# Patient Record
Sex: FEMALE | Race: WHITE | NOT HISPANIC OR LATINO | Employment: FULL TIME | URBAN - METROPOLITAN AREA
[De-identification: names, ages, dates, MRNs, and addresses within clinical notes are randomized per-mention and may not be internally consistent; named-entity substitution may affect disease eponyms.]

---

## 2022-04-29 ENCOUNTER — HOSPITAL ENCOUNTER (EMERGENCY)
Facility: HOSPITAL | Age: 27
Discharge: HOME | End: 2022-04-30
Attending: EMERGENCY MEDICINE | Admitting: EMERGENCY MEDICINE

## 2022-04-29 DIAGNOSIS — L50.9 URTICARIA: Primary | ICD-10-CM

## 2022-04-29 PROCEDURE — 99282 EMERGENCY DEPT VISIT SF MDM: CPT

## 2022-04-30 VITALS
TEMPERATURE: 98.6 F | WEIGHT: 182.6 LBS | RESPIRATION RATE: 18 BRPM | SYSTOLIC BLOOD PRESSURE: 118 MMHG | OXYGEN SATURATION: 98 % | HEIGHT: 72 IN | DIASTOLIC BLOOD PRESSURE: 70 MMHG | BODY MASS INDEX: 24.73 KG/M2 | HEART RATE: 77 BPM

## 2022-04-30 PROCEDURE — 99282 EMERGENCY DEPT VISIT SF MDM: CPT

## 2022-04-30 PROCEDURE — 25010000002 DEXAMETHASONE PER 1 MG: Performed by: EMERGENCY MEDICINE

## 2022-04-30 PROCEDURE — 96372 THER/PROPH/DIAG INJ SC/IM: CPT

## 2022-04-30 RX ORDER — DEXAMETHASONE SODIUM PHOSPHATE 4 MG/ML
10 INJECTION, SOLUTION INTRA-ARTICULAR; INTRALESIONAL; INTRAMUSCULAR; INTRAVENOUS; SOFT TISSUE ONCE
Status: COMPLETED | OUTPATIENT
Start: 2022-04-30 | End: 2022-04-30

## 2022-04-30 RX ADMIN — DEXAMETHASONE SODIUM PHOSPHATE 10 MG: 4 INJECTION, SOLUTION INTRAMUSCULAR; INTRAVENOUS at 00:38

## 2022-06-16 ENCOUNTER — HOSPITAL ENCOUNTER (EMERGENCY)
Facility: HOSPITAL | Age: 27
Discharge: LEFT BEFORE DISCHARGE | End: 2022-06-16

## 2022-06-16 PROCEDURE — 99211 OFF/OP EST MAY X REQ PHY/QHP: CPT

## 2022-06-17 ENCOUNTER — HOSPITAL ENCOUNTER (EMERGENCY)
Facility: HOSPITAL | Age: 27
Discharge: HOME | End: 2022-06-17
Attending: EMERGENCY MEDICINE | Admitting: EMERGENCY MEDICINE

## 2022-06-17 VITALS
HEIGHT: 72 IN | SYSTOLIC BLOOD PRESSURE: 132 MMHG | RESPIRATION RATE: 19 BRPM | DIASTOLIC BLOOD PRESSURE: 73 MMHG | HEART RATE: 74 BPM | WEIGHT: 181 LBS | TEMPERATURE: 98.1 F | BODY MASS INDEX: 24.52 KG/M2 | OXYGEN SATURATION: 100 %

## 2022-06-17 DIAGNOSIS — Z91.09 ALLERGY TO ENVIRONMENTAL FACTORS: ICD-10-CM

## 2022-06-17 DIAGNOSIS — L50.9 URTICARIA: ICD-10-CM

## 2022-06-17 DIAGNOSIS — L50.9 HIVES: Primary | ICD-10-CM

## 2022-06-17 PROCEDURE — 96372 THER/PROPH/DIAG INJ SC/IM: CPT

## 2022-06-17 PROCEDURE — 25010000002 DEXAMETHASONE SODIUM PHOSPHATE 10 MG/ML SOLUTION

## 2022-06-17 PROCEDURE — 99282 EMERGENCY DEPT VISIT SF MDM: CPT

## 2022-06-17 RX ORDER — DEXAMETHASONE SODIUM PHOSPHATE 10 MG/ML
10 INJECTION, SOLUTION INTRAMUSCULAR; INTRAVENOUS ONCE
Status: COMPLETED | OUTPATIENT
Start: 2022-06-17 | End: 2022-06-17

## 2022-06-17 RX ORDER — PREDNISONE 20 MG/1
20 TABLET ORAL DAILY
Qty: 5 TABLET | Refills: 0 | Status: SHIPPED | OUTPATIENT
Start: 2022-06-17 | End: 2022-06-22

## 2022-06-17 RX ADMIN — DEXAMETHASONE SODIUM PHOSPHATE 10 MG: 10 INJECTION INTRAMUSCULAR; INTRAVENOUS at 10:05

## 2022-06-17 NOTE — DISCHARGE INSTRUCTIONS
As discussed, take steroids over the next 5 days for hives and itching.  You may also try taking antihistamine such as Benadryl to help with the symptoms.  Ice packs or cool water may help with itching.     Follow-up with ophthalmologist for rash surrounding your left eye.  Follow-up with your primary care provider as needed.      Return to the ER for any new or worsening symptoms.

## 2022-06-17 NOTE — ED PROVIDER NOTES
"Subjective   Chief Complaint: Hives    Context: Patient is a 27-year-old  female who presents today with complaints of left orbital swelling, redness, diffuse hives, and itching.  She states her symptoms began Wednesday after working in the garden.  She states that she \"is not new to hives\" and reports high sensitivity to environmental allergens.  She has not been allergy tested and cannot identify the exact cause of her hives today.  She denies pain and vision changes but states that her eye feels like it is burning.  She states that the rash has spread \"everywhere that she has scratched.\"  She has not taken any medication for her symptoms.  She reports her only allergy is morphine which \"makes her puke to [place].\"  She denies chest pain, dizziness, fever, sore throat.    Duration: 2 days    Timing: Waxes and wanes    Severity: Denies pain    Associated: Facial swelling, erythema, uticaria          Review of Systems   Constitutional: Negative for activity change, fatigue and fever.   HENT: Positive for facial swelling. Negative for congestion, rhinorrhea, sneezing and sore throat.    Eyes: Positive for redness. Negative for photophobia, itching and visual disturbance.   Respiratory: Negative for cough, chest tightness and shortness of breath.    Cardiovascular: Negative for chest pain and palpitations.   Gastrointestinal: Negative for abdominal pain, constipation, diarrhea, nausea and vomiting.   Endocrine: Negative.    Genitourinary: Negative for dysuria, flank pain and urgency.   Musculoskeletal: Negative for arthralgias, joint swelling and myalgias.   Skin: Positive for color change and rash.   Allergic/Immunologic: Positive for environmental allergies.   Neurological: Negative for dizziness, syncope, weakness and headaches.   Hematological: Negative.    Psychiatric/Behavioral: Negative for confusion. The patient is not nervous/anxious.        Past Medical History:   Diagnosis Date   • Celiac disease " 2015       Allergies   Allergen Reactions   • Morphine GI Intolerance   • Latex Hives       Past Surgical History:   Procedure Laterality Date   • KNEE SURGERY         No family history on file.    Social History     Socioeconomic History   • Marital status: Single   Tobacco Use   • Smoking status: Current Every Day Smoker     Packs/day: 0.50   Substance and Sexual Activity   • Alcohol use: Defer   • Drug use: Never   • Sexual activity: Never           Objective   Physical Exam  Vitals and nursing note reviewed.   Constitutional:       General: She is not in acute distress.     Appearance: Normal appearance. She is normal weight. She is not ill-appearing.   HENT:      Head: Normocephalic and atraumatic.   Eyes:      General: No scleral icterus.        Right eye: No discharge.         Left eye: No discharge.      Extraocular Movements: Extraocular movements intact.      Conjunctiva/sclera: Conjunctivae normal.      Pupils: Pupils are equal, round, and reactive to light.      Comments: Left periorbital erythema, sclera is not injected.   Cardiovascular:      Rate and Rhythm: Normal rate and regular rhythm.      Pulses: Normal pulses.      Heart sounds: Normal heart sounds. No murmur heard.  Pulmonary:      Effort: Pulmonary effort is normal. No respiratory distress.      Breath sounds: Normal breath sounds.   Abdominal:      General: Bowel sounds are normal.      Palpations: Abdomen is soft.      Tenderness: There is no abdominal tenderness.   Musculoskeletal:         General: No swelling or tenderness. Normal range of motion.      Cervical back: Normal range of motion and neck supple.   Skin:     General: Skin is warm and dry.      Capillary Refill: Capillary refill takes less than 2 seconds.      Findings: Erythema and rash present.      Comments: Diffuse erythematous, urticarial blanchable rash to bilateral arms, legs, and left face.  Not evident on torso, back.   Neurological:      General: No focal deficit  "present.      Mental Status: She is alert and oriented to person, place, and time. Mental status is at baseline.      GCS: GCS eye subscore is 4. GCS verbal subscore is 5. GCS motor subscore is 6.      Cranial Nerves: Cranial nerves are intact.      Sensory: Sensation is intact.      Deep Tendon Reflexes: Reflexes are normal and symmetric.   Psychiatric:         Mood and Affect: Mood normal.         Behavior: Behavior normal.         Procedures           ED Course      /73   Pulse 74   Temp 98.1 °F (36.7 °C)   Resp 19   Ht 185.4 cm (73\")   Wt 82.1 kg (181 lb)   LMP 05/12/2022   SpO2 100%   Breastfeeding No   BMI 23.88 kg/m²   Labs Reviewed - No data to display  Medications   dexamethasone sodium phosphate injection 10 mg (10 mg Intramuscular Given 6/17/22 1005)     No radiology results for the last day                                             MDM  Number of Diagnoses or Management Options  Allergy to environmental factors  Hives  Urticaria  Diagnosis management comments: Patient refused to put on a gown prior to assessment.  She is alert, nontoxic-appearing and afebrile during exam.  She states that she has had many episodes similar to this in the past.  I offered her Benadryl and steroids.  She declined Benadryl stating \"it will not do anything.  It never has.\"  I told her that we would try steroids and I would check on her again soon.  She states that the \"long-acting steroid\" will take 2 to 3 days to take effect and \"that hives are not new to her.\"  Patient does not seem receptive to taking and new information or trying Benadryl for this reaction.  She was medicated with Decadron IM.    Chart Review: Patient was last seen in the ER 4/9 for your urticaria, at which time she was medicated with Decadron and discharged.  It appears that patient signed in yesterday but left without being seen.    Comorbidities: Environmental sensitivities, history hives  Differentials: Allergic reaction, " environmental allergies, fever, viral illness.  Not all inclusive of differentials considered.     Lab Interpretation: Not warranted at this time  Radiology Interpretation: Not warranted at this time    Appropriate PPE worn during exam.    Upon reassessment, patient reports mild improvement in symptoms but reiterates that the steroid will take several days to work for her.  I offered a steroid prescription as well and patient excepted, stating that she was worried with rash being so close to her eye.  I agreed that this is the biggest risk at this time.  I advised patient to follow-up with ophthalmology regarding orbital rash.  Patient states that she was just seen at her eye doctor but I reminded her that this was prior to her symptom onset and a follow-up is recommended.  Patient verbalized understanding and states she is ready for discharge at this time.   Patient is aware that discharge does not mean that nothing is wrong but it indicates no emergency is present and they must continue care with follow-up as given below or physician of their choice.    This document is intended for medical expert use only.  Reading of this document by patients and/or patient's family without participating medical staff guidance may result in misinterpretation and unintended morbidity.  Any interpretation of such data is the responsibility of the patient and/or family member responsible for the patient in concert with their primary or specialist providers, not to be left for sources of online search as such as [employer], [employer] or similar queries.  Relying on these approaches to knowledge may result in misinterpretation, misguided goals of care and even death should patient or family members try recommendations outside of the realm of professional medical care in a supervised inpatient environment.    Risk of Complications, Morbidity, and/or Mortality  Presenting problems: high  Diagnostic procedures: high  Management options:  high    Patient Progress  Patient progress: improved      Final diagnoses:   Hives   Allergy to environmental factors   Urticaria       ED Disposition  ED Disposition     ED Disposition   Discharge    Condition   Stable    Comment   --             Danielle Bustillo MD  [address] [place]  [facility] [address]  [place] [address] [phone]          [facility] - [address] [address] [phone]    As needed, If symptoms worsen         Medication List      New Prescriptions    predniSONE 20 MG tablet  Commonly known as: DELTASONE  Take 1 tablet by mouth Daily for 5 days.           Where to Get Your Medications      These medications were sent to [id number] - [facility] [place] - [address] - [phone]  - [phone] FX  [address] [facility] [place] [address]    Phone: [phone]   · predniSONE 20 MG tablet          Tena Andrade, APRN  06/17/22 8673